# Patient Record
Sex: FEMALE | Race: WHITE | Employment: OTHER | URBAN - METROPOLITAN AREA
[De-identification: names, ages, dates, MRNs, and addresses within clinical notes are randomized per-mention and may not be internally consistent; named-entity substitution may affect disease eponyms.]

---

## 2018-06-20 ENCOUNTER — HOSPITAL ENCOUNTER (EMERGENCY)
Age: 83
Discharge: ANOTHER HOSPITAL | End: 2018-06-20
Attending: EMERGENCY MEDICINE
Payer: MEDICARE

## 2018-06-20 ENCOUNTER — APPOINTMENT (OUTPATIENT)
Dept: MRI IMAGING | Age: 83
End: 2018-06-20
Attending: HOSPITALIST
Payer: MEDICARE

## 2018-06-20 ENCOUNTER — APPOINTMENT (OUTPATIENT)
Dept: GENERAL RADIOLOGY | Age: 83
End: 2018-06-20
Payer: MEDICARE

## 2018-06-20 ENCOUNTER — APPOINTMENT (OUTPATIENT)
Dept: CT IMAGING | Age: 83
End: 2018-06-20
Payer: MEDICARE

## 2018-06-20 ENCOUNTER — HOSPITAL ENCOUNTER (OUTPATIENT)
Age: 83
Discharge: HOME | End: 2018-06-20
Payer: MEDICARE

## 2018-06-20 ENCOUNTER — HOSPITAL ENCOUNTER (OUTPATIENT)
Age: 83
Setting detail: OBSERVATION
Discharge: HOME | End: 2018-06-22
Attending: HOSPITALIST | Admitting: INTERNAL MEDICINE
Payer: MEDICARE

## 2018-06-20 VITALS
HEART RATE: 88 BPM | WEIGHT: 137 LBS | OXYGEN SATURATION: 97 % | DIASTOLIC BLOOD PRESSURE: 82 MMHG | TEMPERATURE: 98.5 F | HEIGHT: 65 IN | RESPIRATION RATE: 14 BRPM | SYSTOLIC BLOOD PRESSURE: 179 MMHG | BODY MASS INDEX: 22.82 KG/M2

## 2018-06-20 DIAGNOSIS — R42 DIZZINESS: Primary | ICD-10-CM

## 2018-06-20 DIAGNOSIS — R27.0 ATAXIA: ICD-10-CM

## 2018-06-20 PROBLEM — G45.9 TIA (TRANSIENT ISCHEMIC ATTACK): Status: ACTIVE | Noted: 2018-06-20

## 2018-06-20 LAB
ANION GAP SERPL CALCULATED.3IONS-SCNC: 12 MMOL/L (ref 7–16)
APTT: 28.4 SEC (ref 24.5–35.1)
BASOPHILS ABSOLUTE: 0.04 E9/L (ref 0–0.2)
BASOPHILS RELATIVE PERCENT: 0.6 % (ref 0–2)
BUN BLDV-MCNC: 9 MG/DL (ref 8–23)
CALCIUM SERPL-MCNC: 9.2 MG/DL (ref 8.6–10.2)
CHLORIDE BLD-SCNC: 102 MMOL/L (ref 98–107)
CO2: 25 MMOL/L (ref 22–29)
CREAT SERPL-MCNC: 0.8 MG/DL (ref 0.5–1)
EOSINOPHILS ABSOLUTE: 0.15 E9/L (ref 0.05–0.5)
EOSINOPHILS RELATIVE PERCENT: 2.2 % (ref 0–6)
GFR AFRICAN AMERICAN: >60
GFR NON-AFRICAN AMERICAN: >60 ML/MIN/1.73
GLUCOSE BLD-MCNC: 121 MG/DL (ref 74–109)
HCT VFR BLD CALC: 36.9 % (ref 34–48)
HEMOGLOBIN: 11.8 G/DL (ref 11.5–15.5)
IMMATURE GRANULOCYTES #: 0.06 E9/L
IMMATURE GRANULOCYTES %: 0.9 % (ref 0–5)
INR BLD: 1
LYMPHOCYTES ABSOLUTE: 1.73 E9/L (ref 1.5–4)
LYMPHOCYTES RELATIVE PERCENT: 25.4 % (ref 20–42)
MCH RBC QN AUTO: 28.3 PG (ref 26–35)
MCHC RBC AUTO-ENTMCNC: 32 % (ref 32–34.5)
MCV RBC AUTO: 88.5 FL (ref 80–99.9)
MONOCYTES ABSOLUTE: 0.54 E9/L (ref 0.1–0.95)
MONOCYTES RELATIVE PERCENT: 7.9 % (ref 2–12)
NEUTROPHILS ABSOLUTE: 4.29 E9/L (ref 1.8–7.3)
NEUTROPHILS RELATIVE PERCENT: 63 % (ref 43–80)
PDW BLD-RTO: 14.5 FL (ref 11.5–15)
PLATELET # BLD: 199 E9/L (ref 130–450)
PMV BLD AUTO: 9.5 FL (ref 7–12)
POTASSIUM SERPL-SCNC: 4 MMOL/L (ref 3.5–5)
PROTHROMBIN TIME: 11.7 SEC (ref 9.3–12.4)
RBC # BLD: 4.17 E12/L (ref 3.5–5.5)
SODIUM BLD-SCNC: 139 MMOL/L (ref 132–146)
TROPONIN: <0.01 NG/ML (ref 0–0.03)
WBC # BLD: 6.8 E9/L (ref 4.5–11.5)

## 2018-06-20 PROCEDURE — 6370000000 HC RX 637 (ALT 250 FOR IP): Performed by: INTERNAL MEDICINE

## 2018-06-20 PROCEDURE — 84484 ASSAY OF TROPONIN QUANT: CPT

## 2018-06-20 PROCEDURE — 93005 ELECTROCARDIOGRAM TRACING: CPT | Performed by: EMERGENCY MEDICINE

## 2018-06-20 PROCEDURE — 2580000003 HC RX 258: Performed by: HOSPITALIST

## 2018-06-20 PROCEDURE — A0425 GROUND MILEAGE: HCPCS

## 2018-06-20 PROCEDURE — 85610 PROTHROMBIN TIME: CPT

## 2018-06-20 PROCEDURE — G0378 HOSPITAL OBSERVATION PER HR: HCPCS

## 2018-06-20 PROCEDURE — 6360000004 HC RX CONTRAST MEDICATION: Performed by: RADIOLOGY

## 2018-06-20 PROCEDURE — 70553 MRI BRAIN STEM W/O & W/DYE: CPT

## 2018-06-20 PROCEDURE — 99284 EMERGENCY DEPT VISIT MOD MDM: CPT

## 2018-06-20 PROCEDURE — 70498 CT ANGIOGRAPHY NECK: CPT

## 2018-06-20 PROCEDURE — 80048 BASIC METABOLIC PNL TOTAL CA: CPT

## 2018-06-20 PROCEDURE — 70496 CT ANGIOGRAPHY HEAD: CPT

## 2018-06-20 PROCEDURE — 2580000003 HC RX 258: Performed by: EMERGENCY MEDICINE

## 2018-06-20 PROCEDURE — 6370000000 HC RX 637 (ALT 250 FOR IP): Performed by: EMERGENCY MEDICINE

## 2018-06-20 PROCEDURE — 85730 THROMBOPLASTIN TIME PARTIAL: CPT

## 2018-06-20 PROCEDURE — 85025 COMPLETE CBC W/AUTO DIFF WBC: CPT

## 2018-06-20 PROCEDURE — A0426 ALS 1: HCPCS

## 2018-06-20 PROCEDURE — 71045 X-RAY EXAM CHEST 1 VIEW: CPT

## 2018-06-20 PROCEDURE — A9579 GAD-BASE MR CONTRAST NOS,1ML: HCPCS | Performed by: RADIOLOGY

## 2018-06-20 PROCEDURE — 36415 COLL VENOUS BLD VENIPUNCTURE: CPT

## 2018-06-20 RX ORDER — M-VIT,TX,IRON,MINS/CALC/FOLIC 27MG-0.4MG
1 TABLET ORAL DAILY
Status: DISCONTINUED | OUTPATIENT
Start: 2018-06-20 | End: 2018-06-22 | Stop reason: HOSPADM

## 2018-06-20 RX ORDER — LORAZEPAM 0.5 MG/1
0.25 TABLET ORAL ONCE
Status: COMPLETED | OUTPATIENT
Start: 2018-06-20 | End: 2018-06-20

## 2018-06-20 RX ORDER — 0.9 % SODIUM CHLORIDE 0.9 %
500 INTRAVENOUS SOLUTION INTRAVENOUS ONCE
Status: COMPLETED | OUTPATIENT
Start: 2018-06-20 | End: 2018-06-20

## 2018-06-20 RX ORDER — CALCIUM CARBONATE 500(1250)
1 TABLET ORAL DAILY
Status: DISCONTINUED | OUTPATIENT
Start: 2018-06-20 | End: 2018-06-22 | Stop reason: HOSPADM

## 2018-06-20 RX ORDER — ESOMEPRAZOLE MAGNESIUM 20 MG/1
6.7 TABLET, DELAYED RELEASE ORAL EVERY MORNING
COMMUNITY

## 2018-06-20 RX ORDER — ONDANSETRON 2 MG/ML
4 INJECTION INTRAMUSCULAR; INTRAVENOUS EVERY 6 HOURS PRN
Status: DISCONTINUED | OUTPATIENT
Start: 2018-06-20 | End: 2018-06-22 | Stop reason: HOSPADM

## 2018-06-20 RX ORDER — SODIUM CHLORIDE 0.9 % (FLUSH) 0.9 %
10 SYRINGE (ML) INJECTION PRN
Status: DISCONTINUED | OUTPATIENT
Start: 2018-06-20 | End: 2018-06-22 | Stop reason: HOSPADM

## 2018-06-20 RX ORDER — PANTOPRAZOLE SODIUM 20 MG/1
40 TABLET, DELAYED RELEASE ORAL EVERY MORNING
Status: DISCONTINUED | OUTPATIENT
Start: 2018-06-21 | End: 2018-06-22 | Stop reason: HOSPADM

## 2018-06-20 RX ORDER — SODIUM CHLORIDE 0.9 % (FLUSH) 0.9 %
10 SYRINGE (ML) INJECTION EVERY 12 HOURS SCHEDULED
Status: DISCONTINUED | OUTPATIENT
Start: 2018-06-20 | End: 2018-06-22 | Stop reason: HOSPADM

## 2018-06-20 RX ORDER — SODIUM CHLORIDE 0.9 % (FLUSH) 0.9 %
10 SYRINGE (ML) INJECTION PRN
Status: DISCONTINUED | OUTPATIENT
Start: 2018-06-20 | End: 2018-06-20 | Stop reason: HOSPADM

## 2018-06-20 RX ORDER — MECLIZINE HCL 12.5 MG/1
25 TABLET ORAL ONCE
Status: COMPLETED | OUTPATIENT
Start: 2018-06-20 | End: 2018-06-20

## 2018-06-20 RX ORDER — ATORVASTATIN CALCIUM 40 MG/1
40 TABLET, FILM COATED ORAL NIGHTLY
Status: DISCONTINUED | OUTPATIENT
Start: 2018-06-20 | End: 2018-06-22 | Stop reason: HOSPADM

## 2018-06-20 RX ORDER — CALCIUM CARBONATE 500(1250)
1 TABLET ORAL DAILY
COMMUNITY

## 2018-06-20 RX ADMIN — IOPAMIDOL 100 ML: 755 INJECTION, SOLUTION INTRAVENOUS at 11:11

## 2018-06-20 RX ADMIN — SODIUM CHLORIDE 500 ML: 9 INJECTION, SOLUTION INTRAVENOUS at 10:43

## 2018-06-20 RX ADMIN — LORAZEPAM 0.25 MG: 0.5 TABLET ORAL at 18:20

## 2018-06-20 RX ADMIN — Medication 10 ML: at 22:28

## 2018-06-20 RX ADMIN — GADOTERIDOL 12 ML: 279.3 INJECTION, SOLUTION INTRAVENOUS at 20:07

## 2018-06-20 RX ADMIN — MECLIZINE 25 MG: 12.5 TABLET ORAL at 10:44

## 2018-06-20 ASSESSMENT — ENCOUNTER SYMPTOMS
SHORTNESS OF BREATH: 0
VOMITING: 0
VISUAL CHANGE: 0
DIARRHEA: 0
ABDOMINAL PAIN: 0
NAUSEA: 0
BLOOD IN STOOL: 0
COUGH: 0
BACK PAIN: 0

## 2018-06-20 ASSESSMENT — PAIN SCALES - GENERAL: PAINLEVEL_OUTOF10: 0

## 2018-06-20 NOTE — ED NOTES
MOBILE crew in room. Report given to crew. Pt requested to amb to BR before leaving. This RN assisted pt to BR per request. No s/s of distress. Resps easy. A&O. Visitor in room with pt personal belongings.      Maria G Mims RN  06/20/18 [id number]

## 2018-06-20 NOTE — ED NOTES
Assumed care of pt. No s/s of distress. Resps easy. A&O. Cardiac/hemodynamic monitoring in place. Pt denies CP/SOB/N/V/D. Pt states she becomes dizzy with amb. Pt in hospital gown and socks. Personal belongings with visitor. Will continue to monitor. Visitor in room.      Maria G Mims RN  06/20/18 [id number]

## 2018-06-20 NOTE — ED PROVIDER NOTES
motion. Neck supple. Cardiovascular: Normal rate, regular rhythm and normal heart sounds. No murmur heard. Pulmonary/Chest: Effort normal and breath sounds normal. No respiratory distress. She has no wheezes. She has no rales. Abdominal: Soft. Bowel sounds are normal. There is no tenderness. There is no rebound and no guarding. Musculoskeletal: She exhibits no edema. Neurological: She is alert and oriented to person, place, and time. No cranial nerve deficit. Coordination normal.   Speech fluent  No limb ataxia  Strength equal  Sensation intact   Skin: Skin is warm and dry. Nursing note and vitals reviewed. Procedures    ED Course as of Jun 21 1134   Thu Jun 21, 2018   1133 Pt give meclizine with no improvement. Concern for central cause of vertigo and/or ambulatory issues and fall risk. Will transfer to [facility] with Dr. David Luna who accepts patient for admission. Discussed results and plan with patient who agrees. [BM]      ED Course User Index  [BM] Jeni Salomon DO       --------------------------------------------- PAST HISTORY ---------------------------------------------  Past Medical History:  has a past medical history of Breast cancer ([facility] [address] GERD (gastroesophageal reflux disease); IBS (irritable bowel syndrome); and Torn ligament. Past Surgical History:  has a past surgical history that includes Mastectomy. Social History:  reports that she has never smoked. She has never used smokeless tobacco. She reports that she does not drink alcohol or use drugs. Family History: family history includes Heart Disease in her maternal grandfather; Kidney Disease in her mother. The patients home medications have been reviewed.     Allergies: Aspirin and Shellfish-derived products    -------------------------------------------------- RESULTS -------------------------------------------------    Lab  Results for orders placed or performed during the hospital encounter of 06/20/18

## 2018-06-20 NOTE — PROGRESS NOTES
Patient up to date on all vaccinations except pneumonia. Pt does not wish to receive the pneumonia vaccine.

## 2018-06-20 NOTE — ED NOTES
Up to BR frequently. States she does this when nervous. No uirinary c/o.      Neli Herron RN  06/20/18 [id number]

## 2018-06-21 LAB
EKG ATRIAL RATE: 86 BPM
EKG P AXIS: 35 DEGREES
EKG P-R INTERVAL: 158 MS
EKG Q-T INTERVAL: 346 MS
EKG QRS DURATION: 84 MS
EKG QTC CALCULATION (BAZETT): 414 MS
EKG R AXIS: 13 DEGREES
EKG T AXIS: 54 DEGREES
EKG VENTRICULAR RATE: 86 BPM

## 2018-06-21 PROCEDURE — G9160 LANG COMP GOAL STATUS: HCPCS

## 2018-06-21 PROCEDURE — G8988 SELF CARE GOAL STATUS: HCPCS

## 2018-06-21 PROCEDURE — 6370000000 HC RX 637 (ALT 250 FOR IP): Performed by: NURSE PRACTITIONER

## 2018-06-21 PROCEDURE — G0378 HOSPITAL OBSERVATION PER HR: HCPCS

## 2018-06-21 PROCEDURE — G8987 SELF CARE CURRENT STATUS: HCPCS

## 2018-06-21 PROCEDURE — 92523 SPEECH SOUND LANG COMPREHEN: CPT

## 2018-06-21 PROCEDURE — 6370000000 HC RX 637 (ALT 250 FOR IP): Performed by: HOSPITALIST

## 2018-06-21 PROCEDURE — 97165 OT EVAL LOW COMPLEX 30 MIN: CPT

## 2018-06-21 PROCEDURE — APPNB180 APP NON BILLABLE TIME > 60 MINS: Performed by: NURSE PRACTITIONER

## 2018-06-21 PROCEDURE — G8979 MOBILITY GOAL STATUS: HCPCS | Performed by: PHYSICAL THERAPIST

## 2018-06-21 PROCEDURE — 6370000000 HC RX 637 (ALT 250 FOR IP): Performed by: INTERNAL MEDICINE

## 2018-06-21 PROCEDURE — 99221 1ST HOSP IP/OBS SF/LOW 40: CPT | Performed by: PSYCHIATRY & NEUROLOGY

## 2018-06-21 PROCEDURE — G9159 LANG COMP CURRENT STATUS: HCPCS

## 2018-06-21 PROCEDURE — 97161 PT EVAL LOW COMPLEX 20 MIN: CPT | Performed by: PHYSICAL THERAPIST

## 2018-06-21 PROCEDURE — G8978 MOBILITY CURRENT STATUS: HCPCS | Performed by: PHYSICAL THERAPIST

## 2018-06-21 PROCEDURE — G9161 LANG COMP D/C STATUS: HCPCS

## 2018-06-21 RX ORDER — ACETAMINOPHEN 500 MG
500 TABLET ORAL ONCE
Status: COMPLETED | OUTPATIENT
Start: 2018-06-21 | End: 2018-06-21

## 2018-06-21 RX ORDER — ACETAMINOPHEN 500 MG
500 TABLET ORAL EVERY 6 HOURS PRN
Status: DISCONTINUED | OUTPATIENT
Start: 2018-06-21 | End: 2018-06-22 | Stop reason: HOSPADM

## 2018-06-21 RX ORDER — ASPIRIN 81 MG/1
81 TABLET ORAL DAILY
Status: DISCONTINUED | OUTPATIENT
Start: 2018-06-21 | End: 2018-06-22 | Stop reason: HOSPADM

## 2018-06-21 RX ADMIN — ACETAMINOPHEN 500 MG: 500 TABLET, FILM COATED ORAL at 20:45

## 2018-06-21 RX ADMIN — ASPIRIN 81 MG: 81 TABLET, COATED ORAL at 11:35

## 2018-06-21 RX ADMIN — ACETAMINOPHEN 500 MG: 500 TABLET ORAL at 21:00

## 2018-06-21 RX ADMIN — Medication 500 MG: at 09:13

## 2018-06-21 RX ADMIN — ACETAMINOPHEN 500 MG: 500 TABLET, FILM COATED ORAL at 11:35

## 2018-06-21 RX ADMIN — PANTOPRAZOLE SODIUM 40 MG: 20 TABLET, DELAYED RELEASE ORAL at 09:13

## 2018-06-21 RX ADMIN — MULTIPLE VITAMINS W/ MINERALS TAB 1 TABLET: TAB at 09:13

## 2018-06-21 RX ADMIN — ATORVASTATIN CALCIUM 40 MG: 40 TABLET, FILM COATED ORAL at 20:46

## 2018-06-21 ASSESSMENT — PAIN DESCRIPTION - LOCATION: LOCATION: HEAD;NECK

## 2018-06-21 ASSESSMENT — PAIN DESCRIPTION - DESCRIPTORS: DESCRIPTORS: ACHING;DISCOMFORT;HEADACHE

## 2018-06-21 ASSESSMENT — PAIN SCALES - GENERAL
PAINLEVEL_OUTOF10: 8
PAINLEVEL_OUTOF10: 0
PAINLEVEL_OUTOF10: 8

## 2018-06-21 ASSESSMENT — PAIN DESCRIPTION - ORIENTATION: ORIENTATION: MID

## 2018-06-21 ASSESSMENT — PAIN DESCRIPTION - PAIN TYPE: TYPE: ACUTE PAIN

## 2018-06-21 NOTE — PROGRESS NOTES
Per Dr. Alex Serrato okay to leave IV out. Spoke with De Monreal NP about if patient still needs ECHO with Bubble study. Likely will be canceled.

## 2018-06-21 NOTE — PROGRESS NOTES
Occupational Therapy  OCCUPATIONAL THERAPY INITIAL EVALUATION      Date:2018  Patient Name: Malka Gosselin  MRN: [id number]  : 1931  Room: [address] [place] [address]     Evaluating OT: Kevin Nathan OTR/L #[id number]      Recommended Adaptive Equipment: w/w, shower chair  AM-PAC Daily Activity Raw Score:   G-Code: CJ    Diagnosis: TIA     Modified Tangela Scale (MRS)  Score     Description  0             No symptoms  1             No significant disability despite symptoms  2             Slight disability; able to look after own affairs  3             Moderate disability; able to ambulate without assist/ requires assist with ADLs  4             Moderate/Severe disability;requires assist to ambulate/assist with ADLs  5             Severe disability;bedridden/incontinent   6               Score:   4    Past Medical History:   Past Medical History:   Diagnosis Date    Breast cancer (HCC)     L breast    GERD (gastroesophageal reflux disease)     IBS (irritable bowel syndrome)     Torn ligament     R leg      Precautions: Fall risk     Home Living: Pt lives with son in a 2 story hoe with 2 DANIELLA and no hand rail from front or level entry from side    Bathroom setup: tub/shower combo  Equipment owned: quad cane    Prior Level of Function: Independent with ADLs; Independent with IADLs; ambulated with quad cane prn  Driving: yes  Occupation: active and independent in house    Pain Level: pt denies pain this session     Cognition: oriented x 4; follows 2 step directions.    fair  Problem solving skills  Good Memory   Good  Sequencing   fair   safety    Sensory:   Hearing: wfl  Vision: wfl    Glasses: yes [] no [] reading []      UE Assessment:  Hand Dominance: Right [x]  Left []     Strength ROM Additional Info:    RUE   4/5 wfl good  and wfl FMC/dexterity noted during ADL tasks     LUE 4/5 wfl good  and wfl FMC/dexterity noted during ADL tasks     Sensation:wfl  Tone: wfl  Edema:none noted     Functional

## 2018-06-21 NOTE — PROGRESS NOTES
SPEECH/LANGUAGE PATHOLOGY  SPEECH/LANGUAGE/COGNITIVE EVALUATION    PATIENT NAME:  Jason Grimaldo      :  1931      TODAY'S DATE:  2018      SPEECH PATHOLOGY DIAGNOSIS:  WFL    THERAPY RECOMMENDATIONS:   [x]Speech Pathology intervention is not warranted at this time.    []Speech Pathology intervention is recommended with emphasis on the following:                  MOTOR SPEECH       Oral Peripheral Examination   [x]Adequate lingual/labial strength   []Generalized oral weakness   []Right labiobuccal weakness   []Left labiobuccal weakness     []Right lingual deviation    []Left lingual deviation   []Inadequate velopharyngeal closure  []Oral apraxia       []CNT    Parameters of Speech Production  Respiration:  [x]WFL []Inadequate for speech production  Articulation:  [x]WFL []Distortions []Anticipatory struggle []CNT  Resonance:  [x]WFL []Hypernasal  []Hyponasal  []Nasal emission []CNT  Quality:   [x]WFL []Hoarse []Harsh []Strained [] Breathiness []CNT  Pitch:    [x]WFL []High []Low []CNT  Intensity: [x]WFL []Loud []Quiet []CNT  Fluency:  [x]Intact []Dysfluent []CNT  Prosody [x]Intact []Monotone []Irregular fluctuation      RECEPTIVE LANGUAGE    Comprehension of Yes/No Questions:   [x]WNL []Incomplete []Latent  []Inconsistent []Perseveration []Cueing  []Unable []CNT    Process  Simple Verbal Commands:   [x]WNL []Incomplete []Latent  []Inconsistent []Perseveration []Cueing  []Unable []CNT  Process Intermediate Verbal Commands:   [x]WNL []Incomplete []Latent  []Inconsistent []Perseveration []Cueing  []Unable []CNT  Process Complex Verbal Commands:   [x]WNL []Incomplete []Latent  []Inconsistent []Perseveration []Cueing  []Unable []CNT    Comprehension of Conversation:     [x]WNL []Incomplete []Latent  []Inconsistent []Perseveration []Cueing  []Unable []CNT       EXPRESSIVE LANGUAGE     Serials: ([x]Functional[] Impaired)    Imitation:  Words  ([x]Functional[] Impaired)    Sentences ([x]Functional[] Patient Active Problem List   Diagnosis    TIA (transient ischemic attack)

## 2018-06-21 NOTE — CONSULTS
tenderness/mass/nodules---good ROM; no recurrence of symptoms with passive quick movements of her head  Lungs: clear to auscultation bilaterally  Heart: regular rate and rhythm, S1, S2 normal, no murmur, click, rub or gallop  Extremities: atraumatic, no cyanosis or edema---slight hammertoes  Pulses: 2+ and symmetric  Skin: color, texture, turgor normal---no rashes or lesions     Mental Status: alert, oriented x4; thought content appropriate; memories intact; appropriate attention/concentration; appropriate fundus of knowledge  Speech: no dysarthria  Language: no aphasias    Cranial Nerves:  I: smell    II: visual acuity     II: visual fields Full    II: pupils LUPE   III,VII: ptosis None   III,IV,VI: extraocular muscles  EOMI without nystagmus    V: mastication Normal   V: facial light touch sensation  Normal to LT and PP   V,VII: corneal reflex     VII: facial muscle function - upper  Normal   VII: facial muscle function - lower Normal   VIII: hearing Normal   IX: soft palate elevation  Normal   IX,X: gag reflex    XI: trapezius strength  5/5   XI: sternocleidomastoid strength 5/5   XI: neck extension strength  5/5   XII: tongue strength  Normal     Motor:   5/5 throughout  Normal bulk and tone   No drift  No abnormal movements    Sensory:  LT and PP normal  Vibration normal     Coordination:   FN, FFM and OSMANI normal  HS normal    Gait:  Limps heavily on the R---slightly stooped--requires assist of one    DTR:   Right Brachioradialis reflex 2+  Left Brachioradialis reflex 2+  Right Biceps reflex 2+  Left Biceps reflex 2+  Right Triceps reflex 1+  Left Triceps reflex 1+  Right Quadriceps reflex 1+  Left Quadriceps reflex 1+  Right Achilles reflex 0  Left Achilles reflex 0    No Babinskis  No Paul's     No other pathological reflexes    Laboratory/Radiology:     CBC with Differential:    Lab Results   Component Value Date    WBC 6.8 06/20/2018    RBC 4.17 06/20/2018    HGB 11.8 06/20/2018    HCT 36.9 06/20/2018

## 2018-06-22 VITALS
TEMPERATURE: 97.9 F | SYSTOLIC BLOOD PRESSURE: 124 MMHG | DIASTOLIC BLOOD PRESSURE: 72 MMHG | HEART RATE: 96 BPM | OXYGEN SATURATION: 97 % | HEIGHT: 65 IN | WEIGHT: 137 LBS | BODY MASS INDEX: 22.82 KG/M2 | RESPIRATION RATE: 14 BRPM

## 2018-06-22 LAB
LV EF: 55 %
LVEF MODALITY: NORMAL

## 2018-06-22 PROCEDURE — 93306 TTE W/DOPPLER COMPLETE: CPT

## 2018-06-22 PROCEDURE — G0378 HOSPITAL OBSERVATION PER HR: HCPCS

## 2018-06-22 PROCEDURE — 6370000000 HC RX 637 (ALT 250 FOR IP): Performed by: NURSE PRACTITIONER

## 2018-06-22 PROCEDURE — 6370000000 HC RX 637 (ALT 250 FOR IP): Performed by: HOSPITALIST

## 2018-06-22 RX ADMIN — PANTOPRAZOLE SODIUM 40 MG: 20 TABLET, DELAYED RELEASE ORAL at 09:12

## 2018-06-22 RX ADMIN — ASPIRIN 81 MG: 81 TABLET, COATED ORAL at 09:13

## 2018-06-22 RX ADMIN — Medication 500 MG: at 09:13

## 2018-06-22 RX ADMIN — MULTIPLE VITAMINS W/ MINERALS TAB 1 TABLET: TAB at 09:13

## 2018-06-22 ASSESSMENT — PAIN SCALES - GENERAL: PAINLEVEL_OUTOF10: 0

## 2018-06-22 NOTE — PLAN OF CARE
Problem: Falls - Risk of:  Goal: Will remain free from falls  Will remain free from falls   Outcome: Met This Shift    Goal: Absence of physical injury  Absence of physical injury   Outcome: Met This Shift      Problem: Pain:  Goal: Pain level will decrease  Pain level will decrease   Outcome: Met This Shift    Goal: Control of acute pain  Control of acute pain   Outcome: Met This Shift

## 2023-10-28 NOTE — DISCHARGE SUMMARY
[address]                   [address] [facility],  [facility]                                 DISCHARGE SUMMARY    PATIENT NAME: Wen Connors                 :        1931  MED REC NO:   [id number]                            ROOM:       8501  ACCOUNT NO:   [de-identified]                           ADMIT DATE: 2018  PROVIDER:     Kirk Brown MD                 DISCHARGE DATE:  2018    The patient is an 68-year-old woman. She was admitted to the hospital on   and discharged on . DISCHARGE DIAGNOSES:  Intermittent vertigo, probably related to cervical  osteoarthritis versus labyrinthitis. HOSPITAL COURSE:  The patient was admitted because of vertigo, which was  very severe affecting her balance. It was of an intermittent nature. She  came to the hospital with dizziness occurring the morning of admission. It  was different and worse than the usual dizziness she gets from her usual  vertigo with benign positional vertigo. This time, the room was spinning. She felt off balance. The dizziness got worse with turning her head side  to side. She did not have a headache. She did have CTA of the neck and  head in the emergency room showing no vascular stenosis. She had a CT of  her head showing bilateral lacunar infarcts of indeterminate age with no  focal signs on physical exam.  Her glucose was 121, and her troponins were  negative. She does describe in her history chronic neck pain relieved with  massages, and again, the dizziness was precipitated by turning head side to  side. She did have an aortic sclerosis murmur, and continuing workup with  an MRI of her brain did show the infarcts described as old lacunar  infarcts. Her blood pressure remained steady, but at one point she had  postural hypotension, but this was not able to be replicated. She was  stable for several days. 2-D echo was finally done.   Rhythm strips showed  no arrhythmias, and it was showing sclerosis of the aortic valve, but  otherwise no stenosis or hemodynamic significant changes. It was a  negative echo. Ejection fraction was 55%. There was evidence of stage I  diastolic dysfunction only, and she was discharged in good condition to  home. Her usual medications were continued. DISCHARGE MEDICATIONS:  Medications at home on discharge,  1. Os-Ward 500 daily. 2.  Magnesium 30 mg daily. 3.  Theragran daily. 4.  Nexium 20 mg daily. I will be seeing her in my office within a week.         Cosme Diana MD    D: 06/26/2018 10:06:30       T: 06/26/2018 14:36:33     [email or web address]  Job#: [id number]     Doc#: [id number]    CC: Apixaban/Eliquis increases your risk for bleeding. Notify your doctor if you experience any of the following side effects: bleeding, coughing or vomiting blood, red or black stool, unexpected pain or swelling, itching or hives, chest pain, chest tightness, trouble breathing, changes in how much or how often you urinate, red or pink urine, numbness or tingling in your feet, or unusual muscle weakness. When Apixaban/Eliquis is taken with other medicines, they can affect how it works. Taking other medications such as aspirin, blood thinners, nonsteroidal anti-inflammatories, and medications that treat depression can increase your risk of bleeding. It is very important to tell your health care provider about all of the other medicines, including over-the-counter medications, herbs, and vitamins you are taking. DO NOT start, stop, or change the dosage of any medicine, including over-the-counter medicines, vitamins, and herbal products without your doctor’s approval. Any products containing aspirin or are nonsteroidal anti-inflammatories lessen the blood’s ability to form clots and add to the effect of Apixaban/Eliquis. Never take aspirin or medicines that contain aspirin without speaking to your doctor.

## 2024-07-24 DIAGNOSIS — Z85.3 PERSONAL HISTORY OF MALIGNANT NEOPLASM OF BREAST: Primary | ICD-10-CM

## 2024-12-29 DIAGNOSIS — S80.02XA CONTUSION OF LEFT KNEE, INITIAL ENCOUNTER: Primary | ICD-10-CM
